# Patient Record
Sex: FEMALE | Race: WHITE | NOT HISPANIC OR LATINO | URBAN - METROPOLITAN AREA
[De-identification: names, ages, dates, MRNs, and addresses within clinical notes are randomized per-mention and may not be internally consistent; named-entity substitution may affect disease eponyms.]

---

## 2020-06-15 ENCOUNTER — HISTORICAL (OUTPATIENT)
Dept: ADMINISTRATIVE | Facility: HOSPITAL | Age: 65
End: 2020-06-15

## 2020-06-15 LAB
INR PPP: 0.9 (ref 0–1.3)
PROTHROMBIN TIME: 11.5 SECOND(S) (ref 11.1–13.7)

## 2021-09-24 ENCOUNTER — HISTORICAL (OUTPATIENT)
Dept: ADMINISTRATIVE | Facility: HOSPITAL | Age: 66
End: 2021-09-24

## 2021-09-24 LAB
BUN SERPL-MCNC: 17.1 MG/DL (ref 9.8–20.1)
CALCIUM SERPL-MCNC: 10.7 MG/DL (ref 8.4–10.2)
CHLORIDE SERPL-SCNC: 92 MMOL/L (ref 98–107)
CO2 SERPL-SCNC: 27 MMOL/L (ref 23–31)
CREAT SERPL-MCNC: 1.11 MG/DL (ref 0.55–1.02)
CREAT UR-MCNC: 88.2 MG/DL (ref 45–106)
CREAT/UREA NIT SERPL: 15
EST CREAT CLEARANCE SER (OHS): 42.9 ML/MIN
GLUCOSE SERPL-MCNC: 398 MG/DL (ref 82–115)
MICROALBUMIN UR-MCNC: 19.2 MG/L
MICROALBUMIN/CREAT RATIO PNL UR: 21.8 MG/GM CR (ref 0–30)
POTASSIUM SERPL-SCNC: 3.9 MMOL/L (ref 3.5–5.1)
SODIUM SERPL-SCNC: 128 MMOL/L (ref 136–145)
T4 FREE SERPL-MCNC: <0.42 NG/DL (ref 0.7–1.48)
TSH SERPL-ACNC: 144.03 UIU/ML (ref 0.35–4.94)

## 2021-12-09 ENCOUNTER — HISTORICAL (OUTPATIENT)
Dept: ENDOCRINOLOGY | Facility: CLINIC | Age: 66
End: 2021-12-09

## 2021-12-09 LAB — CORTIS SERPL-SCNC: 12.1 UG/DL

## 2022-01-21 LAB
LEFT EYE DM RETINOPATHY: POSITIVE
RIGHT EYE DM RETINOPATHY: POSITIVE

## 2022-01-28 ENCOUNTER — HISTORICAL (OUTPATIENT)
Dept: WOUND CARE | Facility: HOSPITAL | Age: 67
End: 2022-01-28

## 2022-01-28 LAB
ABS NEUT (OLG): 7.97 (ref 2.1–9.2)
BASOPHILS # BLD AUTO: 0.1 10*3/UL (ref 0–0.2)
BASOPHILS NFR BLD AUTO: 1 %
CHOLEST SERPL-MCNC: 201 MG/DL
CHOLEST/HDLC SERPL: 5 {RATIO} (ref 0–5)
DEPRECATED CALCIDIOL+CALCIFEROL SERPL-MC: 20.8 NG/ML (ref 30–80)
EOSINOPHIL # BLD AUTO: 0.1 10*3/UL (ref 0–0.9)
EOSINOPHIL NFR BLD AUTO: 1 %
ERYTHROCYTE [DISTWIDTH] IN BLOOD BY AUTOMATED COUNT: 12.9 % (ref 11.5–14.5)
EST. AVERAGE GLUCOSE BLD GHB EST-MCNC: 159.9 MG/DL
FLAG2 (OHS): 70
FLAG3 (OHS): 80
FLAGS (OHS): 80
HBA1C MFR BLD: 7.2 %
HCT VFR BLD AUTO: 40.3 % (ref 35–46)
HDLC SERPL-MCNC: 41 MG/DL (ref 35–60)
HGB BLD-MCNC: 13.2 G/DL (ref 12–16)
IMM GRANULOCYTES # BLD AUTO: 0.1 10*3/UL
IMM GRANULOCYTES NFR BLD AUTO: 1 %
IMM. NE 1 SUSPECT FLAG (OHS): 10
LDLC SERPL CALC-MCNC: 108 MG/DL (ref 50–140)
LOW EVENT # SUSPECT FLAG (OHS): 80
LYMPHOCYTES # BLD AUTO: 3.5 10*3/UL (ref 0.6–4.6)
LYMPHOCYTES NFR BLD AUTO: 27 %
MANUAL DIFF? (OHS): NO
MCH RBC QN AUTO: 29.9 PG (ref 26–34)
MCHC RBC AUTO-ENTMCNC: 32.8 G/DL (ref 31–37)
MCV RBC AUTO: 91.4 FL (ref 80–100)
MO BLASTS SUSPECT FLAG (OHS): 40
MONOCYTES # BLD AUTO: 1 10*3/UL (ref 0.1–1.3)
MONOCYTES NFR BLD AUTO: 8 %
NEUTROPHILS # BLD AUTO: 7.97 10*3/UL (ref 2.1–9.2)
NEUTROPHILS NFR BLD AUTO: 62 %
NRBC BLD AUTO-RTO: 0 % (ref 0–0.2)
PLATELET # BLD AUTO: 383 10*3/UL (ref 130–400)
PMV BLD AUTO: 10.4 FL (ref 7.4–10.4)
RBC # BLD AUTO: 4.41 10*6/UL (ref 4–5.2)
T4 FREE SERPL-MCNC: 1.05 NG/DL (ref 0.7–1.48)
TRIGL SERPL-MCNC: 260 MG/DL (ref 37–140)
TSH SERPL-ACNC: 22.48 M[IU]/L (ref 0.35–4.94)
VLDLC SERPL CALC-MCNC: 52 MG/DL
WBC # SPEC AUTO: 12.9 10*3/UL (ref 4.5–11)

## 2022-04-10 ENCOUNTER — HISTORICAL (OUTPATIENT)
Dept: ADMINISTRATIVE | Facility: HOSPITAL | Age: 67
End: 2022-04-10
Payer: MEDICARE

## 2022-04-26 VITALS
HEIGHT: 63 IN | WEIGHT: 194.69 LBS | SYSTOLIC BLOOD PRESSURE: 107 MMHG | BODY MASS INDEX: 34.5 KG/M2 | DIASTOLIC BLOOD PRESSURE: 71 MMHG

## 2022-05-03 NOTE — HISTORICAL [facility] CERNER
This is a historical note converted from Jovita. Formatting and pictures may have been removed.  Please reference Jovita for original formatting and attached multimedia. Chief Complaint  new patient referral for DM/hypothyroidism  History of Present Illness  9/24/2021?endocrine clinic note: 65-year-old female scheduled today?as new patient referral to endocrine clinic for uncontrolled type 2 diabetes?and hypothyroidism.? History of uncontrolled type 2 diabetes, CHF, COPD, CAD, hypertension, hypothyroidism.? Uncontrolled type 2 diabetes?current A1c 11.7 previous 10.3.? Patient states she checks CBGs?3-4 times a day?did not bring a meter or log today?states her blood sugars run anywhere from 160-300s?denies any hypoglycemia.? Patient reports she was previously seeing Dr. Werner?endocrinologist in [place]?she states in the past she was on Metformin and was unable to take any dose due to severe diarrhea?and also on Trulicity and was unable to take?due to severe diarrhea and vomiting.? Patient is currently?on glimepiride and Januvia along with?Levemir with a NovoLog sliding scale.? Patient states her NovoLog sliding scale was based on?her CBG.? Patient states?with her prandial insulin she takes 0 to 20 units?with CBG check.? Discussed with patient we will start diabetes education and a proactive?and titrate insulin.? Patient denies missing any insulin doses. ?Instructed patient we will keep a detailed log and return to diabetes education?or titration.? Hypothyroidism?on patients referral TSH 10.672, free T4 0.89.? Patient states with her previous endocrinologist?it was difficult to control her?hypothyroidism?and she was increased to L T4 175 mcg?2 tablets/day equaling?350 mcg along with levothyroxine?30?mcg.? Patient states compliance with her medication?denies any missed doses?but at times she does take with milk.? Discussed with patient taking along with water only?not with?milk foods or other medications.?  Hypertension at goal today.? Neuropathy patient is currently on gabapentin states she has?neuropathy constantly.? She uses a walker for assistance with walking and gave up her drivers license.  Review of Systems  General:? No weight changes, health fair?no fever or chills. No sweats or fatigue  Skin: No rashes, No itching, No color changes, No abnormal moles  Eyes: Yes glasses, aging vision loss, No blurring, No floaters, No diplopia  Ears/Nose:?No decreased hearing, No?tinnitus, No rhinorrhea, No sinus pressure, No congestion ?  Mouth/Throat: No hoarseness or sore throat, No dysphagia, No oral ulcer, No dental problems  Respiratory: Yes dyspnea on exertion or rest, No Pleuritic pain, No cough, No Sputum  Cardiovascular: No Chest pain, No Palpitations, yes?edema, No orthopnea  Gastrointestinal: Good appetite, history of regurgitation, frequent?nausea. ?History of?vomiting, No Bloating, frequent diarrhea ??  Genitourinary:? No pain, No Urgency, No hematuria, No urinary incontinence, No discharge  Gynecological: No pelvic pain, No vaginal discharge, No breast tenderness or masses, postmenopausal  Musculoskeletal: Generalized joint pain (hands/elbow/shoulder/hips/knees/feet) No am stiffness, No back or neck pain  Hematologic: No Sickle cell, yes easy bleeding, No excessive bruising, No pallor  Neurological: No Seizures, No Headache, No memory loss, No tremors, No LOC  Psychiatric: History of?depression, No anxiety, No Panic, No hallucinations, No tearfulness  Sleep: No Insomnia, No snoring, No apneas, No hx of CPAP or Bipap  Physical Exam  Vitals & Measurements  T:?36.8? ?C (Oral)? HR:?78(Peripheral)? RR:?20? BP:?107/71?  HT:?160.00?cm? WT:?88.300?kg? BMI:?34.49?  General:? Alert and oriented, No acute distress, General health good  Eye:? Pupils are equal, round and reactive to light, Normal conjunctiva.?  HENT:? Normocephalic, Normal hearing, Oral mucosa is moist, No pharyngeal erythema.?  Neck:? Supple, Non-tender,  No carotid bruit, No jugular venous distention, No lymphadenopathy, No thyromegaly.?  Respiratory:? Lungs are clear to auscultation, Respirations are non-labored, Breath sounds are equal, Symmetrical chest wall expansion.?  Cardiovascular:? Normal rate, Regular rhythm, No murmur, Good pulses equal in all extremities, decreased peripheral perfusion, 1+ edema.?  Gastrointestinal:? Soft, Non-tender, Non-distended, Normal bowel sounds, No organomegaly.?  Genitourinary:? No costovertebral angle tenderness.?  Lymphatics:? No lymphadenopathy neck, axilla, groin.?  Musculoskeletal: Normal range of motion, Normal strength, No tenderness, No deformity, assistance with walker  Integumentary:? Warm, Dry.?  Neurologic:? Alert, Oriented.?  Psychiatric:? Cooperative, Appropriate mood & affect.?  ?  Assessment/Plan  HTN - Hypertension?I10  Low sodium diet (Dash Diet)  Continue Medications as prescribed  Monitor Blood daily, report any consistent numbers greater than 140/90  Maintain healthy weight  ?  ?  Hypothyroidism?E03.9  ?No labs on patients chart  Currently on levothyroxine 175 mcg 2 tablets daily, liothyronine 30mcg  TSH,?free T4 today  Educated on taking medications with water only  Ordered:  Free T4, Routine collect, *Est. 09/24/21 3:00:00 CDT, Blood, Order for future visit, *Est. Stop date 09/24/21 3:00:00 CDT, Lab Collect, Hypothyroidism, 09/24/21 8:57:00 CDT  Thyroid Stimulating Hormone, Routine collect, *Est. 09/24/21 3:00:00 CDT, Blood, Order for future visit, *Est. Stop date 09/24/21 3:00:00 CDT, Lab Collect, Hypothyroidism, 09/24/21 8:57:00 CDT  ?  Neuropathy?G62.9  ?Currently on gabapentin  ?  Uncontrolled type 2 diabetes mellitus?E11.65  Current A1C?11.7 previous 10.3  Urine Micro Creatine/ratio: Urine micro today  Medications: Glimepiride 4 mg 2 tablets daily,?Januvia 100 mg?daily, Levemir 65 units?at bedtime, NovoLog (none-20units) on sliding scale Changes: C-Peptid today with BMP?and consider medication  changes, DM Education 2 weeks?with log?titrate insulin  Eye Exam: March 2021 [facility] ?  Home Glucometer Use: 3-4 times a day  Last Hypoglycemic episode: None  Follow ADA diet, avoid soda, simple sweets, and limit rice, breads and starches  Maintain healthy weight, exercise 4-5 times a week for 30?minutes  Diet and medication compliance discussed on visit  RTC 3 months, DM education 2 weeks with log  Ordered:  Basic Metabolic Panel, Routine collect, *Est. 09/24/21 3:00:00 CDT, Blood, Order for future visit, *Est. Stop date 09/24/21 3:00:00 CDT, Lab Collect, Uncontrolled type 2 diabetes mellitus, 09/24/21 8:57:00 CDT  C-Peptide, Serum-[facility] [id number], Routine collect, *Est. 09/24/21 3:00:00 CDT, Blood, Order for future visit, *Est. Stop date 09/24/21 3:00:00 CDT, Lab Collect, Venous Draw, Uncontrolled type 2 diabetes mellitus, Print Label By Order Location, 09/24/21 8:57:00 CDT  Glutamic Acid Decarboxylase Antibody-[facility] [id number], Routine collect, *Est. 09/24/21 3:00:00 CDT, Blood, Order for future visit, *Est. Stop date 09/24/21 3:00:00 CDT, Lab Collect, Venous Draw, Uncontrolled type 2 diabetes mellitus, Print Label By Order Location, 09/24/21 8:57:00 CDT  Hemoglobin A1c [facility] [id number] POC, 09/24/21 9:10:00 CDT, [facility]  Microalbum/Creatinine Ratio Urine (Microalb/Creat), Routine collect, Urine, Order for future visit, *Est. 09/24/21 3:00:00 CDT, *Est. Stop date 09/24/21 3:00:00 CDT, Nurse collect, Uncontrolled type 2 diabetes mellitus  ?  Referrals  [facility] Internal Referral to Diabetes Education, Specialty: Diabetes Education, Start: 09/24/21 9:19:00 CDT   Problem List/Past Medical History  Ongoing  Angina  CAD - Coronary artery disease  CHF - Congestive heart failure  COPD - Chronic obstructive pulmonary disease  Diabetes mellitus  Edema  HTN - Hypertension  Nausea and vomiting  Thyroid disorder  Historical  No qualifying data  Procedure/Surgical History  Colonoscopy, flexible; diagnostic,  including collection of specimen(s) by brushing or washing, when performed (separate procedure) (02/10/2021)  Esophagogastroduodenoscopy, flexible, transoral; diagnostic, including collection of specimen(s) by brushing or washing, when performed (separate procedure) (02/10/2021)  B/L TUBAL LIGATION   section  Cholecystectomy  EXCISION OF CYST LEFT AXILLA.  HEART STENTS TIMES 2  SPIDER BITE: MULTI LOCATIONAL I&D SHE STATES.  Tonsillectomy   Medications  benzonatate 100 mg oral capsule, 100 mg= 1 cap(s), Oral, TID, PRN  clopidogrel 75 mg oral tablet, 75 mg= 1 tab(s), Oral, Daily,? ?Not Taking per Prescriber: HOLDING SINCE 21 FOR SCOPE ON 2/10/21.  doxylamine-phenylephrine 7.5 mg-10 mg oral tablet, 1 tab(s), Oral, Daily, PRN  fenofibrate 48 mg oral tablet, 48 mg= 1 tab(s), Oral, qPM  furosemide 40 mg oral tablet, 40 mg= 1 tab(s), Oral, Daily, PRN  gabapentin 300 mg oral capsule, 300 mg= 1 cap(s), Oral, TID  glimepiride 4 mg oral tablet, 8 mg= 2 tab(s), Oral, Daily  hydrocortisone topical 2.5% cream, TOP, BID, PRN  hydrOXYzine pamoate 50 mg oral capsule, 50 mg= 1 cap(s), Oral, TID, PRN  isosorbide DInitrate 30 mg oral tablet, 30 mg= 1 tab(s), Oral, Daily  Januvia 100 mg oral tablet, 100 mg= 1 tab(s), Oral, Daily  ketoconazole 2% topical cream, TOP, BID, PRN  Levemir FlexTouch 100 units/mL subcutaneous solution, 65 units= 65 units, Subcutaneous, At Bedtime  levothyroxine 175 mcg (0.175 mg) oral tablet  Linzess 145 mcg oral capsule, 145 mcg= 1 cap(s), Oral, Daily  liothyronine 25 mcg oral tablet, 25 mcg= 1 tab(s), Oral, Daily  liothyronine 5 mcg oral tablet, 5 mcg= 1 tab(s), Oral, Daily  meclizine 25 mg oral tablet, 25 mg= 1 tab(s), Oral, BID, PRN  nitroglycerin 0.4 mg sublingual TAB, 0.4 mg= 1 tab(s), SL, q5min, PRN  ondansetron 8 mg oral tablet, 8 mg= 1 tab(s), Oral, TID, PRN  propranolol 20 mg oral tablet, 20 mg= 1 tab(s), Oral, BID  Trelegy Ellipta 100 mcg-62.5 mcg-25 mcg/inh inhalation powder, 1 puff(s),  INH, Daily  Allergies  sulfa drugs?(Rash)  metFORMIN?(Diarrhea)  Trulicity Pen?(Diarrhea, Vomiting)  Social History  Abuse/Neglect  No, No, Yes, 09/24/2021  No, 02/08/2021  Alcohol  Past, 09/24/2021  Employment/School  Disabled, 09/24/2021  Home/Environment  Lives with Alone., 09/24/2021  Nutrition/Health  Regular, 09/24/2021  Spiritual/Cultural  Muslim, 09/24/2021  Substance Use  Never, 09/24/2021  Tobacco  Never (less than 100 in lifetime), N/A, 09/24/2021  Family History  Congestive heart disease.: Mother.  Diabetes mellitus: Mother.  Heart disease: Mother.  Immunizations  Vaccine Date Status   zoster vaccine, inactivated 08/13/2021 Recorded   COVID-19 mRNA, LNP-S, PF - Moderna 04/23/2021 Recorded   COVID-19 mRNA, LNP-S, PF - Moderna 03/26/2021 Recorded   pneumococcal 13-valent conjugate vaccine 12/11/2017 Recorded   Health Maintenance  Health Maintenance  ???Pending?(in the next year)  ??? ??OverDue  ??? ? ? ?Alcohol Misuse Screening due??01/02/21??and every 1??year(s)  ??? ??Due?  ??? ? ? ?ADL Screening due??09/24/21??and every 1??year(s)  ??? ? ? ?Aspirin Therapy for CVD Prevention due??09/24/21??and every 1??year(s)  ??? ? ? ?COPD Maintenance-Pulmonary Rehab Education due??09/24/21??and every 1??year(s)  ??? ? ? ?COPD Management-COPD Medications Prescribed due??09/24/21??and every 1??year(s)  ??? ? ? ?Diabetes Maintenance-Fasting Lipid Profile due??09/24/21??Variable frequency  ??? ? ? ?HF-ACEI/ARB Prescribed if Clinically Indicated due??09/24/21??and every 1??year(s)  ??? ? ? ?HF-Ejection Fraction Past 13 Months if Hospitalized due??09/24/21??and every 1??year(s)  ??? ? ? ?HF-Fluid Restriction/Low Sodium Diet Education due??09/24/21??and every 1??year(s)  ??? ? ? ?Hypertension Management-Education due??09/24/21??and every 1??year(s)  ??? ? ? ?Medicare Annual Wellness Exam due??09/24/21??and every 1??year(s)  ??? ? ? ?Tetanus Vaccine due??09/24/21??and every 10??year(s)  ??? ??Due In Future?  ??? ? ?  ?Obesity Screening not due until??01/01/22??and every 1??year(s)  ??? ? ? ?Functional Assessment not due until??01/02/22??and every 1??year(s)  ??? ? ? ?Coronary Artery Disease Maintenance-BMP not due until??02/10/22??and every 1??year(s)  ??? ? ? ?Diabetes Maintenance-Serum Creatinine not due until??02/10/22??and every 1??year(s)  ??? ? ? ?COPD Management-Oxygen Assessment not due until??02/10/22??and every 1??year(s)  ??? ? ? ?Coronary Artery Disease Maintenance-Electrocardiogram not due until??02/10/22??and every 1??year(s)  ???Satisfied?(in the past 1 year)  ??? ??Satisfied?  ??? ? ? ?Blood Pressure Screening on??09/24/21.??Satisfied by Jamila Casas  ??? ? ? ?Body Mass Index Check on??09/24/21.??Satisfied by Jamila Casas  ??? ? ? ?COPD Management-Oxygen Assessment on??02/10/21.??Satisfied by Adrienne Meade RN  ??? ? ? ?Coronary Artery Disease Maintenance-Electrocardiogram on??02/10/21.  ??? ? ? ?Depression Screening on??09/24/21.??Satisfied by Jamila Casas  ??? ? ? ?Diabetes Maintenance-Serum Creatinine on??02/10/21.??Satisfied by Paty Servin  ??? ? ? ?Diabetes Screening on??02/10/21.??Satisfied by Paty Servin  ??? ? ? ?Functional Assessment on??02/10/21.??Satisfied by Vera Martínez RN  ??? ? ? ?HF-Beta Blocker Prescribed if Clinically Indicated on??02/08/21.  ??? ? ? ?Hypertension Management-Blood Pressure on??09/24/21.??Satisfied by Jamila Casas  ??? ? ? ?Influenza Vaccine on??09/24/21.??Satisfied by Jamila Casas  ??? ? ? ?Obesity Screening on??09/24/21.??Satisfied by Jamila Casas  ??? ? ? ?Zoster Vaccine on??08/13/21.??Satisfied by Jamila Casas  ?

## 2022-07-08 PROBLEM — I10 HYPERTENSION: Status: ACTIVE | Noted: 2022-07-08

## 2022-07-08 PROBLEM — R11.2 NAUSEA AND VOMITING: Status: ACTIVE | Noted: 2022-07-08

## 2022-07-08 PROBLEM — I20.9 ANGINA PECTORIS: Status: ACTIVE | Noted: 2022-07-08

## 2022-07-08 PROBLEM — I50.9 CONGESTIVE HEART FAILURE: Status: ACTIVE | Noted: 2022-07-08

## 2022-07-08 PROBLEM — E07.9 DISORDER OF THYROID: Status: ACTIVE | Noted: 2022-07-08

## 2022-07-08 PROBLEM — J44.9 CHRONIC OBSTRUCTIVE PULMONARY DISEASE: Status: ACTIVE | Noted: 2022-07-08

## 2022-07-08 PROBLEM — I25.10 ARTERIOSCLEROSIS OF CORONARY ARTERY: Status: ACTIVE | Noted: 2022-07-08

## 2022-07-08 PROBLEM — R60.9 EDEMA: Status: ACTIVE | Noted: 2022-07-08

## 2022-07-14 ENCOUNTER — OFFICE VISIT (OUTPATIENT)
Dept: ENDOCRINOLOGY | Facility: CLINIC | Age: 67
End: 2022-07-14
Payer: MEDICARE

## 2022-07-14 VITALS
HEART RATE: 87 BPM | SYSTOLIC BLOOD PRESSURE: 106 MMHG | WEIGHT: 207.31 LBS | RESPIRATION RATE: 18 BRPM | BODY MASS INDEX: 36.73 KG/M2 | HEIGHT: 63 IN | TEMPERATURE: 98 F | DIASTOLIC BLOOD PRESSURE: 61 MMHG

## 2022-07-14 DIAGNOSIS — E55.9 VITAMIN D DEFICIENCY: ICD-10-CM

## 2022-07-14 DIAGNOSIS — E16.2 HYPOGLYCEMIA: ICD-10-CM

## 2022-07-14 DIAGNOSIS — Z11.59 ENCOUNTER FOR HEPATITIS C SCREENING TEST FOR LOW RISK PATIENT: ICD-10-CM

## 2022-07-14 DIAGNOSIS — E03.9 HYPOTHYROIDISM, UNSPECIFIED TYPE: ICD-10-CM

## 2022-07-14 DIAGNOSIS — E11.65 UNCONTROLLED TYPE 2 DIABETES MELLITUS WITH HYPERGLYCEMIA: Primary | ICD-10-CM

## 2022-07-14 DIAGNOSIS — Z11.4 SCREENING FOR HIV (HUMAN IMMUNODEFICIENCY VIRUS): ICD-10-CM

## 2022-07-14 LAB
CREAT UR-MCNC: 92.4 MG/DL (ref 47–110)
HBA1C MFR BLD: 8.9 %
MICROALBUMIN UR-MCNC: 19.4 UG/ML
MICROALBUMIN/CREAT RATIO PNL UR: 21 MG/GM CR (ref 0–30)

## 2022-07-14 PROCEDURE — 3078F DIAST BP <80 MM HG: CPT | Mod: CPTII,,, | Performed by: NURSE PRACTITIONER

## 2022-07-14 PROCEDURE — 3066F NEPHROPATHY DOC TX: CPT | Mod: CPTII,,, | Performed by: NURSE PRACTITIONER

## 2022-07-14 PROCEDURE — 3052F HG A1C>EQUAL 8.0%<EQUAL 9.0%: CPT | Mod: CPTII,,, | Performed by: NURSE PRACTITIONER

## 2022-07-14 PROCEDURE — 99215 PR OFFICE/OUTPT VISIT, EST, LEVL V, 40-54 MIN: ICD-10-PCS | Mod: S$PBB,,, | Performed by: NURSE PRACTITIONER

## 2022-07-14 PROCEDURE — 3061F PR NEG MICROALBUMINURIA RESULT DOCUMENTED/REVIEW: ICD-10-PCS | Mod: CPTII,,, | Performed by: NURSE PRACTITIONER

## 2022-07-14 PROCEDURE — 95251 PR GLUCOSE MONITOR, 72 HOUR, PHYS INTERP: ICD-10-PCS | Mod: ,,, | Performed by: NURSE PRACTITIONER

## 2022-07-14 PROCEDURE — 3066F PR DOCUMENTATION OF TREATMENT FOR NEPHROPATHY: ICD-10-PCS | Mod: CPTII,,, | Performed by: NURSE PRACTITIONER

## 2022-07-14 PROCEDURE — 99215 OFFICE O/P EST HI 40 MIN: CPT | Mod: PBBFAC | Performed by: NURSE PRACTITIONER

## 2022-07-14 PROCEDURE — 95251 CONT GLUC MNTR ANALYSIS I&R: CPT | Mod: ,,, | Performed by: NURSE PRACTITIONER

## 2022-07-14 PROCEDURE — 1159F MED LIST DOCD IN RCRD: CPT | Mod: CPTII,,, | Performed by: NURSE PRACTITIONER

## 2022-07-14 PROCEDURE — 3008F PR BODY MASS INDEX (BMI) DOCUMENTED: ICD-10-PCS | Mod: CPTII,,, | Performed by: NURSE PRACTITIONER

## 2022-07-14 PROCEDURE — 3074F SYST BP LT 130 MM HG: CPT | Mod: CPTII,,, | Performed by: NURSE PRACTITIONER

## 2022-07-14 PROCEDURE — 1160F RVW MEDS BY RX/DR IN RCRD: CPT | Mod: CPTII,,, | Performed by: NURSE PRACTITIONER

## 2022-07-14 PROCEDURE — 1160F PR REVIEW ALL MEDS BY PRESCRIBER/CLIN PHARMACIST DOCUMENTED: ICD-10-PCS | Mod: CPTII,,, | Performed by: NURSE PRACTITIONER

## 2022-07-14 PROCEDURE — 3061F NEG MICROALBUMINURIA REV: CPT | Mod: CPTII,,, | Performed by: NURSE PRACTITIONER

## 2022-07-14 PROCEDURE — 82043 UR ALBUMIN QUANTITATIVE: CPT | Performed by: NURSE PRACTITIONER

## 2022-07-14 PROCEDURE — 3074F PR MOST RECENT SYSTOLIC BLOOD PRESSURE < 130 MM HG: ICD-10-PCS | Mod: CPTII,,, | Performed by: NURSE PRACTITIONER

## 2022-07-14 PROCEDURE — 83036 HEMOGLOBIN GLYCOSYLATED A1C: CPT | Mod: PBBFAC | Performed by: NURSE PRACTITIONER

## 2022-07-14 PROCEDURE — 1159F PR MEDICATION LIST DOCUMENTED IN MEDICAL RECORD: ICD-10-PCS | Mod: CPTII,,, | Performed by: NURSE PRACTITIONER

## 2022-07-14 PROCEDURE — 3052F PR MOST RECENT HEMOGLOBIN A1C LEVEL 8.0 - < 9.0%: ICD-10-PCS | Mod: CPTII,,, | Performed by: NURSE PRACTITIONER

## 2022-07-14 PROCEDURE — 99215 OFFICE O/P EST HI 40 MIN: CPT | Mod: S$PBB,,, | Performed by: NURSE PRACTITIONER

## 2022-07-14 PROCEDURE — 3008F BODY MASS INDEX DOCD: CPT | Mod: CPTII,,, | Performed by: NURSE PRACTITIONER

## 2022-07-14 PROCEDURE — 3078F PR MOST RECENT DIASTOLIC BLOOD PRESSURE < 80 MM HG: ICD-10-PCS | Mod: CPTII,,, | Performed by: NURSE PRACTITIONER

## 2022-07-14 RX ORDER — FLUCONAZOLE 50 MG/1
50 TABLET ORAL
COMMUNITY

## 2022-07-14 RX ORDER — DOXYLAMINE SUCCINATE AND PHENYLEPHRINE HYDROCHLORIDE 7.5; 1 MG/1; MG/1
TABLET ORAL
COMMUNITY

## 2022-07-14 RX ORDER — LEVOTHYROXINE SODIUM 175 UG/1
350 TABLET ORAL EVERY MORNING
COMMUNITY
Start: 2022-06-23

## 2022-07-14 RX ORDER — PROPRANOLOL HYDROCHLORIDE 20 MG/1
1 TABLET ORAL 2 TIMES DAILY
COMMUNITY
Start: 2022-05-12

## 2022-07-14 RX ORDER — KETOCONAZOLE 20 MG/G
1 CREAM TOPICAL DAILY PRN
COMMUNITY
Start: 2022-04-21

## 2022-07-14 RX ORDER — TORSEMIDE 20 MG/1
20 TABLET ORAL
COMMUNITY
Start: 2022-06-01

## 2022-07-14 RX ORDER — POTASSIUM CHLORIDE 1500 MG/1
20 TABLET, EXTENDED RELEASE ORAL DAILY
COMMUNITY
Start: 2022-04-21

## 2022-07-14 RX ORDER — INSULIN GLARGINE 100 [IU]/ML
INJECTION, SOLUTION SUBCUTANEOUS
Qty: 15 ML | Refills: 3 | Status: SHIPPED | OUTPATIENT
Start: 2022-07-14

## 2022-07-14 RX ORDER — CLOPIDOGREL BISULFATE 75 MG/1
75 TABLET ORAL DAILY
COMMUNITY
Start: 2022-05-12

## 2022-07-14 RX ORDER — GABAPENTIN 300 MG/1
1 CAPSULE ORAL 2 TIMES DAILY PRN
COMMUNITY
Start: 2022-05-12

## 2022-07-14 RX ORDER — HYDROCORTISONE 25 MG/G
CREAM TOPICAL
COMMUNITY
Start: 2022-06-23

## 2022-07-14 RX ORDER — BUTALBITAL, ACETAMINOPHEN AND CAFFEINE 50; 325; 40 MG/1; MG/1; MG/1
1 TABLET ORAL
COMMUNITY

## 2022-07-14 RX ORDER — LIOTHYRONINE SODIUM 25 UG/1
TABLET ORAL
COMMUNITY
Start: 2022-05-12

## 2022-07-14 RX ORDER — NITROGLYCERIN 0.4 MG/1
TABLET SUBLINGUAL
COMMUNITY

## 2022-07-14 RX ORDER — INSULIN LISPRO 100 [IU]/ML
INJECTION, SOLUTION INTRAVENOUS; SUBCUTANEOUS
COMMUNITY
Start: 2022-06-23 | End: 2022-07-14

## 2022-07-14 RX ORDER — LIOTHYRONINE SODIUM 5 UG/1
5 TABLET ORAL EVERY MORNING
COMMUNITY
Start: 2022-05-18

## 2022-07-14 RX ORDER — ERGOCALCIFEROL 1.25 MG/1
50000 CAPSULE ORAL
COMMUNITY
Start: 2022-05-12

## 2022-07-14 RX ORDER — ASPIRIN 81 MG/1
81 TABLET ORAL
COMMUNITY
Start: 2022-05-12

## 2022-07-14 RX ORDER — FENOFIBRATE 48 MG/1
48 TABLET, FILM COATED ORAL NIGHTLY
COMMUNITY
Start: 2022-05-18

## 2022-07-14 RX ORDER — MECLIZINE HYDROCHLORIDE 25 MG/1
1 TABLET ORAL DAILY PRN
COMMUNITY

## 2022-07-14 RX ORDER — EPINEPHRINE 0.3 MG/.3ML
INJECTION SUBCUTANEOUS
COMMUNITY

## 2022-07-14 RX ORDER — FLUDROCORTISONE ACETATE 0.1 MG/1
0.1 TABLET ORAL 4 TIMES DAILY
COMMUNITY
Start: 2022-05-12

## 2022-07-14 RX ORDER — BENZONATATE 100 MG/1
100 CAPSULE ORAL 3 TIMES DAILY PRN
COMMUNITY

## 2022-07-14 RX ORDER — HYDROXYZINE PAMOATE 50 MG/1
CAPSULE ORAL DAILY PRN
COMMUNITY
Start: 2022-05-12

## 2022-07-14 RX ORDER — ONDANSETRON HYDROCHLORIDE 8 MG/1
8 TABLET, FILM COATED ORAL 3 TIMES DAILY PRN
COMMUNITY
Start: 2022-06-23

## 2022-07-14 RX ORDER — DICLOFENAC SODIUM 75 MG/1
75 TABLET, DELAYED RELEASE ORAL 2 TIMES DAILY PRN
COMMUNITY
Start: 2022-06-10

## 2022-07-14 NOTE — PROGRESS NOTES
Subjective:       Patient ID: Tierney Riddle is a 66 y.o. female.    Chief Complaint: Diabetes    Previous Endocrine Clinic notes:     9/24/2021 endocrine clinic note: 65-year-old female scheduled today as new patient referral to endocrine clinic for uncontrolled type 2 diabetes and hypothyroidism.  History of uncontrolled type 2 diabetes, CHF, COPD, CAD, hypertension, hypothyroidism.  Uncontrolled type 2 diabetes current A1c 11.7 previous 10.3.  Patient states she checks CBGs 3-4 times a day did not bring a meter or log today states her blood sugars run anywhere from 160-300s denies any hypoglycemia.  Patient reports she was previously seeing Dr. Werner endocrinologist in [place] she states in the past she was on Metformin and was unable to take any dose due to severe diarrhea and also on Trulicity and was unable to take due to severe diarrhea and vomiting.  Patient is currently on glimepiride and Januvia along with Levemir with a NovoLog sliding scale.  Patient states her NovoLog sliding scale was based on her CBG.  Patient states with her prandial insulin she takes 0 to 20 units with CBG check.  Discussed with patient we will start diabetes education and a proactive and titrate insulin.  Patient denies missing any insulin doses.  Instructed patient we will keep a detailed log and return to diabetes education or titration.  Hypothyroidism on patient's referral TSH 10.672, free T4 0.89.  Patient states with her previous endocrinologist it was difficult to control her hypothyroidism and she was increased to L T4 175 mcg 2 tablets/day equaling 350 mcg along with levothyroxine 30 mcg.  Patient states compliance with her medication denies any missed doses but at times she does take with milk.  Discussed with patient taking along with water only not with milk foods or other medications.  Hypertension at goal today.  Neuropathy patient is currently on gabapentin states she has neuropathy constantly.  She uses a  walker for assistance with walking and gave up her 's license. (1)    03/02/2022 Endocrine Clinic Note: 66-year-old female scheduled today for endocrine clinic follow-up.  History of uncontrolled type 2 diabetes and hypothyroidism.  Type 2 diabetes current A1c 7.1 previous A1c 7.2, 11.7 and 10.3.  Patient has been having multiple hypoglycemic episodes.  Patient states that she stopped drinking soda and also has cut out carbs and starches and has changed her diet.  Patient states when she takes her medications Lantus was decreased to 25 units she keeps having hypoglycemic episodes in the daytime is having her eat sugar and extra foods to keep her blood sugar up.  She states over the weekend she has stopped all medications x2 days and blood sugars increased to the mid 100s.  Patient in clinic today had a hypoglycemic episode she states she took 25 units of Lantus last night and took her glimepiride this morning.  Patient was offered juice and crackers.  Patient's blood sugar increased to 90.  Patient states lately she has been eating multiple small meals per day with higher protein but in the last 2 weeks started having multiple hypoglycemic episodes and having incorporated sugar to get her blood glucose up.  She states she had multiple episodes of low blood sugars with difficulty increasing her blood glucose.  Instructed patient she is to stop glimepiride at this time.  We will decrease Lantus to 10 units and increase back up.  Hypothyroidism TSH 22.4796 free T4 1.05 on 01/28/2022 improved from previous .0284.  Patient has been taking her medications correctly.    Current Endocrine Clinic Note:  07/14/2022    66-year-old female scheduled today for endocrine clinic follow-up.  History of uncontrolled type 2 diabetes with hypothyroidism.  Type 2 diabetes current A1c 8.9 Previous A1c 7.1, 7.2 and 11.7.  On patient's previous visit patient was having multiple hypoglycemic episodes and Lantus was decreased  and glimepiride was stopped.  At that time patient quit drinking sodas.  Patient had increase in A1c.  Patient previously had hypoglycemia on her previous visit with decreases in medication he but denies any current hypoglycemia and has consistent hyperglycemia.    Patient has a Carlos average glucose 243.  GM I 9.1. 30% very high, 51% high, 11% target range, 0% low, 0% very low.  Patient's scans in average of 3-7 times per day patient has elevated glucose throughout the day in 2- 300s very seldom on Range.  Patient states she was on steroids about a month ago but has not been on steroids per week.  Previously she was on Lantus but has not been taking her Lantus has been on Januvia only.  Patient states occasionally she is taking old Humalog to decrease her blood sugars.  Instructed patient will stop her Lantus and titrate up.    Hypothyroidism previously compliance medication was discussed with patient TSH 22.4796 free T4 1.05 on 01/28/2022 improved from previous .0284.  Patient has been taking medications appropriately will repeat levels today patient denies any weight gain or fatigue.        Review of Systems   Constitutional: Negative for activity change, appetite change and fatigue.   HENT: Negative for dental problem, hearing loss, tinnitus, trouble swallowing and goiter.    Eyes: Negative for photophobia, pain and visual disturbance.   Respiratory: Negative for cough, chest tightness and wheezing.    Cardiovascular: Negative for chest pain, palpitations and leg swelling.   Gastrointestinal: Negative for abdominal pain, constipation, diarrhea, nausea and reflux.   Endocrine: Negative for cold intolerance, heat intolerance, polydipsia and polyphagia.   Genitourinary: Negative for difficulty urinating, flank pain, hematuria, hot flashes, menstrual irregularity, menstrual problem, nocturia and urgency.   Musculoskeletal: Negative for back pain, gait problem, joint swelling, leg pain and joint deformity.    Integumentary:  Negative for color change, pallor, rash and breast discharge.   Allergic/Immunologic: Negative for environmental allergies, food allergies and immunocompromised state.   Neurological: Negative for tremors, seizures, headaches, disturbances in coordination, memory loss and coordination difficulties.   Psychiatric/Behavioral: Negative for agitation, behavioral problems and sleep disturbance. The patient is not nervous/anxious.          Objective:      Physical Exam  Constitutional:       General: She is not in acute distress.     Appearance: Normal appearance. She is not ill-appearing.   HENT:      Head: Normocephalic and atraumatic.      Right Ear: External ear normal.      Left Ear: External ear normal.      Nose: Nose normal. No congestion or rhinorrhea.      Mouth/Throat:      Mouth: Mucous membranes are moist.      Pharynx: Oropharynx is clear. No oropharyngeal exudate.   Eyes:      General:         Right eye: No discharge.         Left eye: No discharge.      Conjunctiva/sclera: Conjunctivae normal.      Pupils: Pupils are equal, round, and reactive to light.   Neck:      Thyroid: No thyroid mass, thyromegaly or thyroid tenderness.   Cardiovascular:      Rate and Rhythm: Normal rate and regular rhythm.      Pulses: Normal pulses.      Heart sounds: Normal heart sounds. No murmur heard.  Pulmonary:      Effort: Pulmonary effort is normal. No respiratory distress.      Breath sounds: Normal breath sounds.   Abdominal:      General: Abdomen is flat. Bowel sounds are normal. There is no distension.      Palpations: Abdomen is soft.      Tenderness: There is no abdominal tenderness.   Musculoskeletal:         General: No swelling or tenderness. Normal range of motion.      Cervical back: Normal range of motion and neck supple. No tenderness.      Right lower leg: No edema.      Left lower leg: No edema.   Feet:      Right foot:      Skin integrity: Skin integrity normal.      Left foot:      Skin  integrity: Skin integrity normal.   Lymphadenopathy:      Cervical: No cervical adenopathy.   Skin:     General: Skin is warm and dry.      Coloration: Skin is not jaundiced or pale.   Neurological:      General: No focal deficit present.      Mental Status: She is alert and oriented to person, place, and time. Mental status is at baseline.      Coordination: Coordination normal.      Gait: Gait normal.   Psychiatric:         Mood and Affect: Mood normal.         Behavior: Behavior normal.         Thought Content: Thought content normal.         Assessment:       Problem List Items Addressed This Visit    None     Visit Diagnoses     Uncontrolled type 2 diabetes mellitus with hyperglycemia    -  Primary    Relevant Medications    insulin (LANTUS SOLOSTAR U-100 INSULIN) glargine 100 units/mL SubQ pen    SITagliptin (JANUVIA) 100 MG Tab    Other Relevant Orders    Hemoglobin A1C, POCT (Completed)    Microalbumin/Creatinine Ratio, Urine (Completed)    Basic Metabolic Panel (Completed)    Hypoglycemia        Hypothyroidism, unspecified type        Relevant Orders    TSH (Completed)    T4, Free (Completed)    T3, Free ([facility]) (Completed)    Vitamin D deficiency        Relevant Orders    Vitamin D (Completed)    Encounter for hepatitis C screening test for low risk patient        Relevant Orders    Hepatitis Panel, Acute (Completed)    Screening for HIV (human immunodeficiency virus)        Relevant Orders    HIV 1/2 Ag/Ab (4th Gen) (Completed)          Plan:   Uncontrolled type 2 diabetes mellitus with hyperglycemia  Current A1C 8.9 previous A1C 7.1,  7.2, 11.7, 10.3  Urine Micro Creatine/ratio: 09/24/2021 normal repeat today 07/14/2022 the  Medications: Januvia 100 mg daily, Levemir 10 units at bedtime ( not on) Changes: Restart Lantus 10 units increase to 5 units every 5 days until fasting    Eye Exam: Jan 2022 request records   Home Glucometer Use: Carlos  Last Hypoglycemic episode: None   Follow ADA diet, avoid  soda, simple sweets, and limit rice, breads and starches  Maintain healthy weight, exercise 4-5 times a week for 30 minutes  Diet and medication compliance discussed on visit  RTC 4 months w/ POC A1C   Component Ref Range & Units 5 mo ago    Hemoglobin A1c <=7.0 7.2    Estimated Average Glucose  159.9    -     Hemoglobin A1C, POCT  -     Microalbumin/Creatinine Ratio, Urine  -     Basic Metabolic Panel; Future; Expected date: 07/14/2022  -     insulin (LANTUS SOLOSTAR U-100 INSULIN) glargine 100 units/mL SubQ pen; Start 10 units daily after 5 days every 5 days until fasting glucose   Dispense: 15 mL; Refill: 3    Hypoglycemia  Resolved     Hypothyroidism, unspecified type  TSH 22.4796, Free T4 1.05 on 01/28/2022   Repeat TSH, Free T4 today   On LT4 175 mcg 2 tabslets, Liothyronine 25 mcg plus 5 mg total 30 mg    Component Ref Range & Units 5 mo ago 9 mo ago   Thyroid Stimulating Hormone 0.3500 - 4.9400 22.4796  144.0284 High  R      Component Ref Range & Units 5 mo ago 9 mo ago   Thyroxine Free 0.70 - 1.48 1.05  <0.42 Low  R    -     TSH; Future; Expected date: 07/14/2022  -     T4, Free; Future; Expected date: 07/14/2022        -     T3, Free ([facility]); Future; Expected date: 07/14/2022    Vitamin D deficiency  Vit D level 20.8   On ergo 50,000IU once a week   Vitamin D level today   Component Ref Range & Units 5 mo ago    Vit D 25 OH 30.0 - 80.0 20.8    -     Vitamin D; Future; Expected date: 07/14/2022    Encounter for hepatitis C screening test for low risk patient  Low risk no previous screening   -     Hepatitis Panel, Acute; Future; Expected date: 07/14/2022    Screening for HIV (human immunodeficiency virus)  Low risk no previous screening   -     HIV 1/2 Ag/Ab (4th Gen); Future; Expected date: 07/14/2022

## 2022-10-03 ENCOUNTER — DOCUMENTATION ONLY (OUTPATIENT)
Dept: ADMINISTRATIVE | Facility: HOSPITAL | Age: 67
End: 2022-10-03
Payer: MEDICARE